# Patient Record
Sex: FEMALE | Race: WHITE | NOT HISPANIC OR LATINO | Employment: OTHER | URBAN - METROPOLITAN AREA
[De-identification: names, ages, dates, MRNs, and addresses within clinical notes are randomized per-mention and may not be internally consistent; named-entity substitution may affect disease eponyms.]

---

## 2023-06-01 ENCOUNTER — OFFICE VISIT (OUTPATIENT)
Dept: URGENT CARE | Facility: CLINIC | Age: 73
End: 2023-06-01

## 2023-06-01 VITALS
HEART RATE: 75 BPM | HEIGHT: 62 IN | BODY MASS INDEX: 22.41 KG/M2 | OXYGEN SATURATION: 100 % | TEMPERATURE: 99.1 F | WEIGHT: 121.8 LBS | RESPIRATION RATE: 16 BRPM

## 2023-06-01 DIAGNOSIS — A69.20 ERYTHEMA MIGRANS (LYME DISEASE): Primary | ICD-10-CM

## 2023-06-01 PROBLEM — M48.061 SPINAL STENOSIS OF LUMBAR REGION AT MULTIPLE LEVELS: Status: ACTIVE | Noted: 2020-07-29

## 2023-06-01 PROBLEM — M25.552 LEFT HIP PAIN: Status: ACTIVE | Noted: 2018-07-23

## 2023-06-01 PROBLEM — I73.00 RAYNAUD'S DISEASE WITHOUT GANGRENE: Status: ACTIVE | Noted: 2017-10-23

## 2023-06-01 RX ORDER — TIMOLOL MALEATE 5 MG/ML
SOLUTION/ DROPS OPHTHALMIC
COMMUNITY
Start: 2023-04-25

## 2023-06-01 RX ORDER — LATANOPROST 50 UG/ML
SOLUTION/ DROPS OPHTHALMIC
COMMUNITY
Start: 2023-04-25

## 2023-06-01 RX ORDER — DOXYCYCLINE 100 MG/1
100 CAPSULE ORAL 2 TIMES DAILY
Qty: 28 CAPSULE | Refills: 0 | Status: SHIPPED | OUTPATIENT
Start: 2023-06-01 | End: 2023-06-15

## 2023-06-01 NOTE — PATIENT INSTRUCTIONS
Lyme Disease   WHAT YOU NEED TO KNOW:   Lyme disease is a bacterial infection caused by the bite of an infected tick  DISCHARGE INSTRUCTIONS:   Call your local emergency number (911 in the [address]) if:   Your heart is beating faster than usual and you feel dizzy  You have chest pain or trouble breathing  You suddenly cannot talk or see well, or you have trouble moving an area of your body  Return to the emergency department if:   You have a headache and a stiff neck  You have trouble concentrating or thinking clearly  You have numbness or tingling in your arms or legs, or you have trouble walking  Call your doctor if:   Your rash grows or spreads to other areas of your body  You suddenly have trouble falling or staying asleep  You have new or worsening pain and swelling in your joints  You have new or worsening weakness and muscle pain  You have a new tick bite  You have questions or concerns about your condition or care  Medicines: You may need any of the following:  Antibiotics  treat a bacterial infection  Take your medicine as directed  Contact your healthcare provider if you think your medicine is not helping or if you have side effects  Tell your provider if you are allergic to any medicine  Keep a list of the medicines, vitamins, and herbs you take  Include the amounts, and when and why you take them  Bring the list or the pill bottles to follow-up visits  Carry your medicine list with you in case of an emergency  Prevent a tick bite:  Ticks live in areas covered by brush and grass  They may even be found in your lawn if you live in certain areas  Outdoor pets can carry ticks inside the house  Ticks can grab onto you or your clothes when you walk by grass or brush  If you go into areas that contain many trees, tall grasses, and underbrush, do the following:     Wear light colored pants and a long-sleeved shirt  Tuck your pants into your socks or boots   Tuck in your shirt  Wear sleeves that fit close to the skin at your wrists and neck  This will help prevent ticks from crawling through gaps in your clothing and onto your skin  Wear a hat in areas with trees  Apply insect repellant on your skin  The insect repellant should contain DEET  Do not put insect repellant on skin that is cut, scratched, or irritated  Always use soap and water to wash the insect repellant off as soon as possible once you are indoors  Do not apply insect repellant on your child's face or hands  Spray insect repellant onto your clothes  Use permethrin spray  This spray kills ticks that crawl on your clothing  Be sure to spray the tops of your boots, bottom of pant legs, and sleeve cuffs  As soon as possible, wash and dry clothing in hot water and high heat  Check your and your child's clothing, hair, and skin for ticks  Shower within 2 hours of coming indoors  Carefully check the hairline, armpits, neck, and waist     Decrease the risk for ticks in your yard  Ticks like to live in shady, moist areas  Alexis Tomason your lawn regularly to keep the grass short  Trim the grass around birdbaths and fences  Cut branches that are overgrown and take them out of the yard  Clear out leaf piles  [facility] firewood in a dry, robe area  Treat pets with tick control products  as directed  This will decrease your risk for a tick bite  Check your pets for ticks  Remove ticks from pets the same way as you remove them from people  Ask your pet's  about the best product to use on your pet  Remove a tick with tweezers  Wear gloves  Grasp the tick as close to your skin as possible  Pull the tick straight up and out  Do not touch the tick with your bare hands  Check to make sure you removed the whole tick, including the head  Clean the area with soap and water or rubbing alcohol  Then wash your hands with soap and water         Follow up with your doctor as directed:  Write down your questions so you remember to ask them during your visits  © Copyright [employer] 2022 Information is for End User's use only and may not be sold, redistributed or otherwise used for commercial purposes  The above information is an  only  It is not intended as medical advice for individual conditions or treatments  Talk to your doctor, nurse or pharmacist before following any medical regimen to see if it is safe and effective for you  Erythema Migrans: The patients presentation is consistent with erythema migrans, will treat with doxycycline 100mg PO BID X 14 days  Take with food and a probiotic  Follow up with PCP to discuss possible blood work and to follow up  Red flag signs discussed

## 2023-06-01 NOTE — PROGRESS NOTES
[facility]        NAME: Sharath Coughlin is a 68 y o  female  : 1950    MRN: [id number]  DATE: 2023  TIME: 4:00PM    Assessment and Plan   Erythema migrans (Lyme disease) [A69 20]  1  Erythema migrans (Lyme disease)  doxycycline monohydrate (MONODOX) 100 mg capsule            Patient Instructions   Erythema Migrans: The patients presentation is consistent with erythema migrans, will treat with doxycycline 100mg PO BID X 14 days  Take with food and a probiotic  Follow up with PCP to discuss possible blood work and to follow up  Red flag signs discussed  Follow up with PCP in 3-5 days  Proceed to  ER if symptoms worsen  Chief Complaint   No chief complaint on file  History of Present Illness       The patient presents today for a tick bite of the L arm over the tricep x 5-7 days  She states that the tick was attached and she was able to remove the tick with no foreign bodies  The tick was not engorged  She is unsure of how long the tick was attached  She now has an erythematous ring around the lesion with central clearing  No oozing or drainage  No fever or chills  No joint pain or body aches  No headache  She would like doxycycline  Review of Systems   Review of Systems   Constitutional: Negative for activity change, appetite change, chills, diaphoresis, fatigue and fever  HENT: Negative for facial swelling, sore throat and trouble swallowing  Respiratory: Negative for chest tightness, shortness of breath, wheezing and stridor  Cardiovascular: Negative for chest pain and palpitations  Musculoskeletal: Negative for arthralgias, joint swelling and myalgias  Skin: Positive for color change, rash and wound  Allergic/Immunologic: Negative for environmental allergies, food allergies and immunocompromised state  Neurological: Negative for dizziness, light-headedness and headaches  Hematological: Negative for adenopathy  Does not bruise/bleed easily  "        Current Medications       Current Outpatient Medications:   •  doxycycline monohydrate (MONODOX) 100 mg capsule, Take 1 capsule (100 mg total) by mouth 2 (two) times a day for 14 days, Disp: 28 capsule, Rfl: 0  •  latanoprost (XALATAN) 0 005 % ophthalmic solution, , Disp: , Rfl:   •  timolol (TIMOPTIC) 0 5 % ophthalmic solution, , Disp: , Rfl:     Current Allergies     Allergies as of 06/01/2023   • (Not on File)            The following portions of the patient's history were reviewed and updated as appropriate: allergies, current medications, past family history, past medical history, past social history, past surgical history and problem list      No past medical history on file  Past Surgical History:   Procedure Laterality Date   • HIP ARTHROPLASTY Left        No family history on file  Medications have been verified  Objective   Pulse 75   Temp 99 1 °F (37 3 °C)   Resp 16   Ht 5' 2\" (1 575 m)   Wt 55 2 kg (121 lb 12 8 oz)   SpO2 100%   BMI 22 28 kg/m²   No LMP recorded  Physical Exam     Physical Exam  Vitals and nursing note reviewed  Constitutional:       General: She is not in acute distress  Appearance: She is well-developed  She is not ill-appearing, toxic-appearing or diaphoretic  HENT:      Mouth/Throat:      Pharynx: Uvula midline  Cardiovascular:      Rate and Rhythm: Normal rate and regular rhythm  Pulses: Normal pulses  Heart sounds: Normal heart sounds, S1 normal and S2 normal  No murmur heard  Pulmonary:      Effort: Pulmonary effort is normal  No tachypnea, accessory muscle usage or respiratory distress  Breath sounds: Normal breath sounds  No stridor  No decreased breath sounds, wheezing, rhonchi or rales  Skin:     Capillary Refill: Capillary refill takes less than 2 seconds  Comments: L tricep: Small eschar where the tick was ~1-2mm with ~1-2cm of erythema surrounding the wound that is warm to touch   There is clearing and " annular erythema surrounding the wound  No oozing or drainage

## 2023-06-09 PROBLEM — A69.20 ERYTHEMA MIGRANS (LYME DISEASE): Status: ACTIVE | Noted: 2023-06-09

## 2023-11-10 ENCOUNTER — OFFICE VISIT (OUTPATIENT)
Dept: URGENT CARE | Facility: CLINIC | Age: 73
End: 2023-11-10

## 2023-11-10 VITALS — OXYGEN SATURATION: 97 % | RESPIRATION RATE: 16 BRPM | TEMPERATURE: 97.4 F | HEART RATE: 71 BPM

## 2023-11-10 DIAGNOSIS — S70.361A TICK BITE OF RIGHT THIGH, INITIAL ENCOUNTER: Primary | ICD-10-CM
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
## 2023-11-10 RX ORDER — DOXYCYCLINE 100 MG/1
200 CAPSULE ORAL ONCE
Qty: 2 CAPSULE | Refills: 0 | Status: SHIPPED | OUTPATIENT
Start: 2023-11-10 | End: 2023-11-10

## 2023-11-10 NOTE — PROGRESS NOTES
[facility]        NAME: Lobo Ramos is a 68 y.o. female  : 1950    MRN: [id number]  DATE: November 10, 2023  TIME: 5:37 PM    Assessment and Plan   Tick bite of right thigh, initial encounter Vernonleila Real. XXXA]  1. Tick bite of right thigh, initial encounter  doxycycline monohydrate (MONODOX) 100 mg capsule            Patient Instructions   Tick bite R thigh:  The patients presentation is consistent with local irritation due to the tick bite, will treat with doxycycline 200mg once. Take with food and a probiotic. Bacitracin applied topically. Follow up with PCP to discuss possible blood work and to follow up. Red flag signs discussed. Follow up with PCP in 3-5 days. Proceed to  ER if symptoms worsen. Chief Complaint     Chief Complaint   Patient presents with    Tick Removal     Pt presents with tick bite right leg that occurred on Wednesday: Leela at site          History of Present Illness       The patient presents today for a tick bite of the right inner thigh x 3 days. She states that she has dogs that bring ticks into the home. She states that the tick was attached and she was able to remove the tick with no foreign bodies. The tick was not engorged. She is unsure of how long the tick was attached. She now has an erythematous ring around the lesion with central clearing. No oozing or drainage. No fever or chills. No joint pain or body aches. No headache. She would like doxycycline. She was seen 2023 for a similar presentation. Review of Systems   Review of Systems   Constitutional:  Negative for activity change, appetite change, chills, diaphoresis, fatigue and fever. HENT:  Negative for facial swelling, sore throat and trouble swallowing. Respiratory:  Negative for chest tightness, shortness of breath, wheezing and stridor. Cardiovascular:  Negative for chest pain and palpitations. Skin:  Positive for rash and wound.    Allergic/Immunologic: Negative for environmental allergies, food allergies and immunocompromised state. Neurological:  Negative for dizziness and light-headedness. Hematological:  Negative for adenopathy. Does not bruise/bleed easily. Current Medications       Current Outpatient Medications:     doxycycline monohydrate (MONODOX) 100 mg capsule, Take 2 capsules (200 mg total) by mouth 1 (one) time for 1 dose, Disp: 2 capsule, Rfl: 0    latanoprost (XALATAN) 0.005 % ophthalmic solution, , Disp: , Rfl:     timolol (TIMOPTIC) 0.5 % ophthalmic solution, , Disp: , Rfl:     Current Allergies     Allergies as of 11/10/2023    (No Known Allergies)            The following portions of the patient's history were reviewed and updated as appropriate: allergies, current medications, past family history, past medical history, past social history, past surgical history and problem list.     History reviewed. No pertinent past medical history. Past Surgical History:   Procedure Laterality Date    HIP ARTHROPLASTY Left        History reviewed. No pertinent family history. Medications have been verified. Objective   Pulse 71   Temp (!) 97.4 °F (36.3 °C)   Resp 16   LMP  (LMP Unknown)   SpO2 97%   No LMP recorded (lmp unknown). Patient is postmenopausal.       Physical Exam     Physical Exam  Vitals and nursing note reviewed. Constitutional:       General: She is not in acute distress. Appearance: She is well-developed. She is not diaphoretic. HENT:      Mouth/Throat:      Pharynx: Uvula midline. Cardiovascular:      Rate and Rhythm: Normal rate and regular rhythm. Pulses: Normal pulses. Heart sounds: Normal heart sounds, S1 normal and S2 normal. No murmur heard. Pulmonary:      Effort: Pulmonary effort is normal. No tachypnea, accessory muscle usage or respiratory distress. Breath sounds: Normal breath sounds. No stridor. No decreased breath sounds, wheezing, rhonchi or rales.    Skin:     General: Skin is warm and dry. Capillary Refill: Capillary refill takes less than 2 seconds. Comments: There is a 1cm area of erythema and edema where the tick was attached over the right inner thigh. No oozing or drainage. No central clearing. No foreign body.

## 2023-11-10 NOTE — PATIENT INSTRUCTIONS
Tick bite R thigh:  The patients presentation is consistent with local irritation due to the tick bite, will treat with doxycycline 200mg once. Take with food and a probiotic. Bacitracin applied topically. Follow up with PCP to discuss possible blood work and to follow up. Red flag signs discussed.

## 2024-07-18 ENCOUNTER — OFFICE VISIT (OUTPATIENT)
Dept: URGENT CARE | Facility: CLINIC | Age: 74
End: 2024-07-18
Payer: MEDICARE

## 2024-07-18 VITALS
HEART RATE: 70 BPM | TEMPERATURE: 98.2 F | OXYGEN SATURATION: 100 % | RESPIRATION RATE: 18 BRPM | SYSTOLIC BLOOD PRESSURE: 108 MMHG | DIASTOLIC BLOOD PRESSURE: 66 MMHG

## 2024-07-18 DIAGNOSIS — M35.00 SJOGREN'S SYNDROME, WITH UNSPECIFIED ORGAN INVOLVEMENT (HCC): Primary | ICD-10-CM

## 2024-07-18 PROCEDURE — G2211 COMPLEX E/M VISIT ADD ON: HCPCS | Performed by: PHYSICIAN ASSISTANT

## 2024-07-18 PROCEDURE — 99213 OFFICE O/P EST LOW 20 MIN: CPT | Performed by: PHYSICIAN ASSISTANT

## 2024-07-18 RX ORDER — METHYLPREDNISOLONE 4 MG/1
TABLET ORAL
COMMUNITY
Start: 2024-07-17

## 2024-07-18 RX ORDER — AMOXICILLIN 500 MG/1
CAPSULE ORAL
COMMUNITY
Start: 2024-07-17

## 2024-07-18 RX ORDER — MELOXICAM 7.5 MG/1
7.5 TABLET ORAL
COMMUNITY
Start: 2024-03-26

## 2024-07-18 NOTE — PROGRESS NOTES
[facility]        NAME: Marylou Rivas is a 74 y.o. female  : 1950    MRN: [id number]  DATE: 2024  TIME: 5:58 PM    Assessment and Plan   Sjogren's syndrome, with unspecified organ involvement (HCC) [M35.00]  1. Sjogren's syndrome, with unspecified organ involvement (HCC)              Patient Instructions   Sjogren's disease: The patient came in today to discuss her newly diagnosed condition. I printed her out patient information from [facility] on the disease. We discussed dietary measures with omega 3 fatty acid consumption increase, grass fed/organic meats and a Mediterranean diet with reduction in sugar, processed foods which can increase inflammation. She will be seeing a Dietician and will have close follow up with her Rheumatologist who will be managing her Sjogren's disease. She has an excellent medical team behind her.  Red flag signs and follow up precautions discussed.     Follow up with PCP in 3-5 days.  Proceed to  ER if symptoms worsen.    If tests have been performed at [facility], our office will contact you with results if changes need to be made to the care plan discussed with you at the visit.  You can review your full results on [facility].    Chief Complaint     Chief Complaint   Patient presents with    Mass     Pt here have lump on her  left side.  Pt states no pain  on going for years.           History of Present Illness       The patient is a 74-year-old female with a hx of Raynaud's and Sjogren's  who presents today to discuss her diet and new diagnosis of Sjogren's, she has concerns about diet. She is under the care of a Rheumatologist Dr.Carol Velarde and saw her yesterday and had blood work done, she was referred to a hand surgeon for L sided hand swelling. She has an EMG pending from . She was prescribed Medrol Dose Jatinder. The patient lied to our  and RN about the lipoma that is not why she is here today. She has no emergent or urgent  concerns to discuss today. She has no fever, chills, body aches. No dyspnea, wheezing, cp, palpitations, dizziness, weakness. No GI sx.         Review of Systems   Review of Systems   Constitutional:  Negative for activity change, appetite change, chills, diaphoresis, fatigue and fever.   HENT:  Negative for congestion, ear discharge, ear pain, facial swelling, hearing loss, postnasal drip, rhinorrhea, sinus pressure, sinus pain, sore throat, tinnitus, trouble swallowing and voice change.    Eyes:  Negative for visual disturbance.   Respiratory:  Negative for apnea, cough, chest tightness, shortness of breath, wheezing and stridor.    Cardiovascular:  Negative for chest pain, palpitations and leg swelling.   Gastrointestinal:  Negative for abdominal distention and abdominal pain.   Genitourinary:  Negative for decreased urine volume.   Musculoskeletal:  Negative for arthralgias, joint swelling, myalgias, neck pain and neck stiffness.   Skin:  Negative for rash.   Allergic/Immunologic: Negative for immunocompromised state.   Neurological:  Negative for dizziness, weakness, light-headedness, numbness and headaches.   Hematological:  Negative for adenopathy.         Current Medications       Current Outpatient Medications:     amoxicillin (AMOXIL) 500 mg capsule, , Disp: , Rfl:     latanoprost (XALATAN) 0.005 % ophthalmic solution, , Disp: , Rfl:     meloxicam (MOBIC) 7.5 mg tablet, Take 7.5 mg by mouth, Disp: , Rfl:     methylPREDNISolone 4 MG tablet therapy pack, , Disp: , Rfl:     timolol (TIMOPTIC) 0.5 % ophthalmic solution, , Disp: , Rfl:     Current Allergies     Allergies as of 07/18/2024    (No Known Allergies)            The following portions of the patient's history were reviewed and updated as appropriate: allergies, current medications, past family history, past medical history, past social history, past surgical history and problem list.     History reviewed. No pertinent past medical history.    Past  Surgical History:   Procedure Laterality Date    HIP ARTHROPLASTY Left        History reviewed. No pertinent family history.      Medications have been verified.        Objective   /66   Pulse 70   Temp 98.2 °F (36.8 °C)   Resp 18   LMP  (LMP Unknown)   SpO2 100%   No LMP recorded (lmp unknown). Patient is postmenopausal.       Physical Exam     Physical Exam  Vitals and nursing note reviewed.   Constitutional:       General: She is not in acute distress.     Appearance: She is well-developed. She is not ill-appearing, toxic-appearing or diaphoretic.   HENT:      Head: Normocephalic and atraumatic.      Nose: Nose normal. No mucosal edema or rhinorrhea.   Eyes:      General: Lids are normal. Vision grossly intact. Gaze aligned appropriately.         Right eye: No discharge or hordeolum.         Left eye: No discharge or hordeolum.      Extraocular Movements: Extraocular movements intact.      Right eye: Normal extraocular motion and no nystagmus.      Left eye: Normal extraocular motion and no nystagmus.      Conjunctiva/sclera:      Right eye: Right conjunctiva is not injected. No exudate.     Left eye: Left conjunctiva is not injected. No exudate.  Cardiovascular:      Rate and Rhythm: Normal rate and regular rhythm.      Heart sounds: Normal heart sounds, S1 normal and S2 normal. Heart sounds not distant. No murmur heard.     No friction rub. No gallop. No S3 or S4 sounds.   Pulmonary:      Effort: Pulmonary effort is normal. No tachypnea, bradypnea, accessory muscle usage or respiratory distress.      Breath sounds: Normal breath sounds and air entry. No stridor, decreased air movement or transmitted upper airway sounds. No decreased breath sounds, wheezing, rhonchi or rales.   Musculoskeletal:      Cervical back: Normal range of motion and neck supple.   Lymphadenopathy:      Cervical: No cervical adenopathy.   Neurological:      Mental Status: She is alert and oriented to person, place, and time.    Psychiatric:         Behavior: Behavior normal.

## 2024-07-18 NOTE — PATIENT INSTRUCTIONS
"Patient Education     Sjögren's disease   The Basics   Written by the doctors and editors at [facility]   What is Sjögren's disease? -- Sjögren's disease is a disease that causes dry eyes, dry mouth, and other symptoms. It happens when the body's infection-fighting system (called the \"immune system\") attacks glands that keep the eyes, mouth, and other parts of the body moist.  Some people with Sjögren's disease have other immune system conditions. These include rheumatoid arthritis, lupus, and scleroderma.  What are the symptoms of Sjögren's disease? -- The main symptoms are dry eyes and dry mouth. Dry eye symptoms can include:   Eyes that feel dry or burn   Trinh or gritty feeling in the eyes   Red or watery eyes   Blurry vision  If you have a dry mouth, you might:   Wake up at night to drink water because your mouth is so dry   Need to drink liquids to help swallow dry foods   Be more likely to get cavities and certain mouth infections  Sjögren's disease can also affect other parts of the body. Symptoms can include:   Dry, itchy skin   Joint or muscle pain   Problems urinating, such as:   Pain   Urinating more often than usual   Getting up a lot at night to urinate   Needing to urinate suddenly   Tiredness   Vaginal dryness - This can make sex painful.  Will I need tests? -- It depends. Not everyone with dry eyes or dry mouth has Sjögren's disease. To figure out what is causing your symptoms, your doctor or nurse will ask you questions and do an exam.  You might also have the following tests:   Eye tests - To check if your eyes are making a normal amount of tears. If not, this could be a sign of Sjögren's disease. An eye doctor called an \"ophthalmologist\" does these tests.   Blood and urine tests - To check for diseases that people with Sjögren's disease often have, or other conditions that could be causing symptoms.   \"Biopsy\" - In this test, a doctor takes a small sample of tissue from inside your lower lip. " "Another doctor looks at it under a microscope for signs of Sjögren's disease.   Imaging tests - Imaging tests create pictures of the inside of your body. They include MRI and ultrasound. Doctors can use these tests to look at the saliva glands in your mouth.  How is Sjögren's disease treated? -- Dry eye treatments include:   Eye ointments and artificial tears - Your doctor or nurse will help you decide which ones are best for you.   Medicated eye drops - Your doctor will decide if these are right for you. Unlike artificial tears, these contain medicine and are not available without a prescription.   \"Punctal occlusion\" - For this procedure, an ophthalmologist puts tiny plugs in the tubes that normally drain tears from the eye. This can help with dry eyes by keeping tears on the eye longer.  Dry mouth treatments include:   Sprays and lozenges   Prescription medicines that you take by mouth  Other treatments include:   Medicines to relieve other symptoms or problems related to Sjögren's disease - For example, if you have joint pain, you can try ibuprofen (sample brand names: Advil, Motrin) or naproxen (sample brand name: Aleve).   Medicines that partly \"turn off\" the immune system  Is there anything I can do on my own to feel better? -- Yes. You can:   Sip water, chew sugarless gum, or suck sugar-free candy to help with dry mouth. If you don't like drinking a lot of water, you can rinse your mouth and spit the water out.   Put lip balm on dry lips and moisturizer on dry skin.   Avoid excess air conditioning or heating. Use a humidifier in your bedroom and other places where you spend a lot of time.   Use \"moisture chambers\" if your doctor or nurse suggests them. Moisture chambers are devices that fit on your glasses. They can help keep your eyes moist (picture 1). You can buy them at most stores that sell glasses.   Brush and floss your teeth after every meal, and see your dentist as often as your doctor or nurse " "tells you to. Dry mouth raises the risk of cavities and some other mouth problems.  What else should I know about Sjögren's disease? -- If you need surgery, make sure that the doctor who gives you anesthesia (called an \"anesthesiologist\") knows that you have Sjögren's disease.  Having Sjögren's disease can increase the risk of certain problems during surgery. Knowing that you have Sjögren's disease helps doctors do things to lower the risk of problems.  All topics are updated as new evidence becomes available and our peer review process is complete.  This topic retrieved from [employer] on: Feb 26, 2024.  Topic 86622 Version 9.0  Release: 32.2.4 - C32.56  © 2024 UpToDate, Inc. and/or its affiliates. All rights reserved.  picture 1: Moisture chambers     Moisture chambers attach to a pair of glasses. They help trap moisture, and can help relieve the symptoms of dry eye.  Graphic 74400 Version 2.0  Consumer Information Use and Disclaimer   Disclaimer: This generalized information is a limited summary of diagnosis, treatment, and/or medication information. It is not meant to be comprehensive and should be used as a tool to help the user understand and/or assess potential diagnostic and treatment options. It does NOT include all information about conditions, treatments, medications, side effects, or risks that may apply to a specific patient. It is not intended to be medical advice or a substitute for the medical advice, diagnosis, or treatment of a health care provider based on the health care provider's examination and assessment of a patient's specific and unique circumstances. Patients must speak with a health care provider for complete information about their health, medical questions, and treatment options, including any risks or benefits regarding use of medications. This information does not endorse any treatments or medications as safe, effective, or approved for treating a specific patient. UpToDate, Inc. and " its affiliates disclaim any warranty or liability relating to this information or the use thereof.The use of this information is governed by the Terms of Use, available at https://www.wolterskluwer.com/en/know/clinical-effectiveness-terms. 2024© UpToDate, Inc. and its affiliates and/or licensors. All rights reserved.  Copyright   © 2024 Up[employer]Date, Inc. and/or its affiliates. All rights reserved.    Sjogren's disease: The patient came in today to discuss her newly diagnosed condition. I printed her out patent information on the disease. We discussed dietary measures with omega 3 fatty acid consumption increase, grass fed/organic meats and a Mediterranean diet with reduction in sugar, processed foods which can increase inflammation. She will be seeing a Dietician and will have close follow up with her Rheumatologist who will be managing her Sjogren's disease. She has an excellent medical team behind her.  Red flag signs and follow up precautions discussed.

## 2024-08-20 ENCOUNTER — TELEPHONE (OUTPATIENT)
Age: 74
End: 2024-08-20

## 2024-08-20 NOTE — TELEPHONE ENCOUNTER
Pt asking for a call back / which doctor is best at treating auto immune condition.    I did tell her drs that we have / which wants to know which one is best at treating auto immune. I told her they all see auto immune.     Still requesting a call back.

## 2024-08-20 NOTE — TELEPHONE ENCOUNTER
Pt calls in and states that she was looking for a second opinion for Sjogrens pt states she will be getting a referral

## 2024-09-03 ENCOUNTER — TELEPHONE (OUTPATIENT)
Age: 74
End: 2024-09-03

## 2024-09-03 NOTE — TELEPHONE ENCOUNTER
Patient calling upset there is nothing sooner than her appt. She is asking if there is anyway for her to be seen sooner. She is already on the wait list. Advised the schedule is booking out a little further at the moment. She is worried about the condition she has and the medication plaquenil that she is taking. Please advise.

## 2024-09-08 NOTE — TELEPHONE ENCOUNTER
She is a new patient seeking a second opinion. Can let her know we don't have sooner availability and as she is currently following with Rheumatology at [facility] she can discuss her concerns with them.

## 2024-10-16 ENCOUNTER — TELEPHONE (OUTPATIENT)
Age: 74
End: 2024-10-16

## 2024-10-16 NOTE — TELEPHONE ENCOUNTER
Pt asked for sooner appt than Dec 16th w/ Dr. Jimenez in [place]    Advised no sooner appts/ pt is on wait list    Phone disconnected

## 2024-11-07 ENCOUNTER — OFFICE VISIT (OUTPATIENT)
Dept: URGENT CARE | Facility: CLINIC | Age: 74
End: 2024-11-07

## 2024-11-07 VITALS
RESPIRATION RATE: 18 BRPM | WEIGHT: 116 LBS | SYSTOLIC BLOOD PRESSURE: 134 MMHG | OXYGEN SATURATION: 98 % | HEART RATE: 74 BPM | BODY MASS INDEX: 21.35 KG/M2 | TEMPERATURE: 97.5 F | DIASTOLIC BLOOD PRESSURE: 70 MMHG | HEIGHT: 62 IN
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
## 2024-11-07 DIAGNOSIS — S80.262A TICK BITE OF LEFT KNEE, INITIAL ENCOUNTER: Primary | ICD-10-CM

## 2024-11-07 RX ORDER — DOXYCYCLINE 100 MG/1
200 CAPSULE ORAL ONCE
Qty: 2 CAPSULE | Refills: 0 | Status: SHIPPED | OUTPATIENT
Start: 2024-11-07 | End: 2024-11-07

## 2024-11-07 RX ORDER — CYCLOSPORINE 0.5 MG/ML
EMULSION OPHTHALMIC 2 TIMES DAILY
COMMUNITY

## 2024-11-07 NOTE — PROGRESS NOTES
[facility]        NAME: Marylou Rivas is a 74 y.o. female  : 1950    MRN: [id number]  DATE: 2024  TIME: 5:31 PM    Assessment and Plan   Tick bite of left knee, initial encounter [S80.262A, W57.XXXA]  1. Tick bite of left knee, initial encounter  doxycycline monohydrate (MONODOX) 100 mg capsule            Patient Instructions   Tick bite L knee:   The patients presentation is consistent with local irritation due to the tick bite, will treat with doxycycline 200mg once.  Take with food and a probiotic. Bacitracin applied topically. Follow up with PCP to discuss possible blood work and to follow up. Red flag signs discussed.      Follow up with PCP in 3-5 days.  Proceed to  ER if symptoms worsen.    If tests have been performed at [facility], our office will contact you with results if changes need to be made to the care plan discussed with you at the visit.  [facility]    Chief Complaint     Chief Complaint   Patient presents with    Insect Bite     Pt states she picked a tick off her left leg a few days ago.          History of Present Illness       The patient presents today for a tick bite of the left lower extremity x 2 days. She states that it was a very small deer tick. She states that she has dogs that bring ticks into the home. She states that the tick was attached and she was able to remove the tick with no foreign bodies. The tick was engorged. She is unsure of how long the tick was attached. She now has an erythematous ring around the lesion with central clearing. No oozing or drainage. No fever or chills. No joint pain or body aches. No headache. She would like doxycycline.              Review of Systems   Review of Systems   Constitutional:  Negative for activity change, appetite change, chills, diaphoresis, fatigue and fever.   HENT:  Negative for congestion, ear discharge, ear pain, facial swelling, hearing loss, postnasal  drip, rhinorrhea, sinus pressure, sinus pain, sore throat, tinnitus, trouble swallowing and voice change.    Eyes:  Negative for visual disturbance.   Respiratory:  Negative for apnea, cough, chest tightness, shortness of breath, wheezing and stridor.    Cardiovascular:  Negative for chest pain, palpitations and leg swelling.   Gastrointestinal:  Negative for abdominal distention and abdominal pain.   Genitourinary:  Negative for decreased urine volume.   Musculoskeletal:  Negative for arthralgias, joint swelling, myalgias, neck pain and neck stiffness.   Skin:  Positive for color change. Negative for rash.   Allergic/Immunologic: Negative for immunocompromised state.   Neurological:  Negative for dizziness, weakness, light-headedness, numbness and headaches.   Hematological:  Negative for adenopathy.         Current Medications       Current Outpatient Medications:     cycloSPORINE (RESTASIS) 0.05 % ophthalmic emulsion, 2 (two) times a day, Disp: , Rfl:     doxycycline monohydrate (MONODOX) 100 mg capsule, Take 2 capsules (200 mg total) by mouth 1 (one) time for 1 dose, Disp: 2 capsule, Rfl: 0    latanoprost (XALATAN) 0.005 % ophthalmic solution, , Disp: , Rfl:     meloxicam (MOBIC) 7.5 mg tablet, Take 7.5 mg by mouth, Disp: , Rfl:     timolol (TIMOPTIC) 0.5 % ophthalmic solution, , Disp: , Rfl:     amoxicillin (AMOXIL) 500 mg capsule, , Disp: , Rfl:     methylPREDNISolone 4 MG tablet therapy pack, , Disp: , Rfl:     Current Allergies     Allergies as of 11/07/2024    (No Active Allergies)            The following portions of the patient's history were reviewed and updated as appropriate: allergies, current medications, past family history, past medical history, past social history, past surgical history and problem list.     Past Medical History:   Diagnosis Date    Glaucoma        Past Surgical History:   Procedure Laterality Date    HIP ARTHROPLASTY Left        History reviewed. No pertinent family  "history.      Medications have been verified.        Objective   /70   Pulse 74   Temp 97.5 °F (36.4 °C)   Resp 18   Ht 5' 2\" (1.575 m)   Wt 52.6 kg (116 lb)   LMP  (LMP Unknown)   SpO2 98%   BMI 21.22 kg/m²   No LMP recorded (lmp unknown). Patient is postmenopausal.       Physical Exam     Physical Exam  Vitals and nursing note reviewed.   Constitutional:       General: She is not in acute distress.     Appearance: Normal appearance. She is not ill-appearing, toxic-appearing or diaphoretic.   Cardiovascular:      Rate and Rhythm: Normal rate and regular rhythm.      Heart sounds: Normal heart sounds, S1 normal and S2 normal.   Pulmonary:      Effort: Pulmonary effort is normal.      Breath sounds: Normal breath sounds and air entry.   Skin:     Comments: Left medial knee there is a very small red randee ~0.25cm with no central clearing, sign of infection, oozing/drainage. No foreign body.    Neurological:      Mental Status: She is alert.                   "

## 2024-11-07 NOTE — PATIENT INSTRUCTIONS
Tick bite L knee:   The patients presentation is consistent with local irritation due to the tick bite, will treat with doxycycline 200mg once.  Take with food and a probiotic. Bacitracin applied topically. Follow up with PCP to discuss possible blood work and to follow up. Red flag signs discussed.

## 2024-11-18 ENCOUNTER — TELEPHONE (OUTPATIENT)
Age: 74
End: 2024-11-18

## 2024-11-18 NOTE — TELEPHONE ENCOUNTER
Patient called in asking if her appointment can be made virtual for 12/16 at 8am and connect via link through text message. Patient states that she is currently having car issues and will not be able to drive to the office. Please advise.     Had a long talk with patient as she is concerned that this appt will be a waste of time because Dr. Jimenez does not have any information on her. I advised patient that we have office notes from her Rheumatologist at [facility] but I'm not seeing any updated lab work. She states that she was looking for a second opinion and wanted to speak with clinical staff for advice. I advised her that unfortunately since she is not an established patient with us we cannot provide any results or medical advice. She kept asking what should she do then and I told her she needs to see the doctor for a visit and have her lab work sent to us.  Patient said she will have her PCP fax over lab work to us, the fax number was provided. I advised patient to call back if she has any other questions or concerns.

## 2024-12-04 ENCOUNTER — TELEPHONE (OUTPATIENT)
Dept: OTHER | Facility: OTHER | Age: 74
End: 2024-12-04

## 2024-12-04 NOTE — TELEPHONE ENCOUNTER
Patient is calling regarding cancelling an appointment.    Date/Time: December 16, 2024 at 8 am    Patient was rescheduled: YES [] NO [x]    Patient requesting call back to reschedule: YES [] NO [x]